# Patient Record
Sex: FEMALE | Race: WHITE | Employment: FULL TIME | URBAN - METROPOLITAN AREA
[De-identification: names, ages, dates, MRNs, and addresses within clinical notes are randomized per-mention and may not be internally consistent; named-entity substitution may affect disease eponyms.]

---

## 2019-05-02 ENCOUNTER — HOSPITAL ENCOUNTER (OUTPATIENT)
Dept: MRI IMAGING | Facility: CLINIC | Age: 39
Discharge: HOME | End: 2019-05-02
Attending: EMERGENCY MEDICINE | Admitting: EMERGENCY MEDICINE
Payer: COMMERCIAL

## 2019-05-02 ENCOUNTER — HOSPITAL ENCOUNTER (EMERGENCY)
Facility: CLINIC | Age: 39
Discharge: HOME | End: 2019-05-02
Attending: EMERGENCY MEDICINE | Admitting: EMERGENCY MEDICINE
Payer: COMMERCIAL

## 2019-05-02 VITALS
TEMPERATURE: 98.9 F | HEIGHT: 60 IN | DIASTOLIC BLOOD PRESSURE: 89 MMHG | SYSTOLIC BLOOD PRESSURE: 145 MMHG | HEART RATE: 105 BPM | RESPIRATION RATE: 20 BRPM | OXYGEN SATURATION: 98 % | WEIGHT: 170 LBS | BODY MASS INDEX: 33.38 KG/M2

## 2019-05-02 DIAGNOSIS — M50.10 CERVICAL DISC DISORDER WITH RADICULOPATHY: ICD-10-CM

## 2019-05-02 PROCEDURE — 99284 EMERGENCY DEPT VISIT MOD MDM: CPT | Mod: 25 | Performed by: EMERGENCY MEDICINE

## 2019-05-02 PROCEDURE — 72141 MRI NECK SPINE W/O DYE: CPT

## 2019-05-02 PROCEDURE — 99284 EMERGENCY DEPT VISIT MOD MDM: CPT | Mod: Z6 | Performed by: EMERGENCY MEDICINE

## 2019-05-02 RX ORDER — METAXALONE 800 MG/1
800 TABLET ORAL 3 TIMES DAILY
Qty: 24 TABLET | Refills: 1 | Status: SHIPPED | OUTPATIENT
Start: 2019-05-02 | End: 2019-05-03

## 2019-05-02 RX ORDER — HYDROCODONE BITARTRATE AND ACETAMINOPHEN 5; 325 MG/1; MG/1
1-2 TABLET ORAL EVERY 4 HOURS PRN
Qty: 12 TABLET | Refills: 0 | Status: SHIPPED | OUTPATIENT
Start: 2019-05-02

## 2019-05-02 RX ORDER — CYCLOBENZAPRINE HCL 10 MG
10 TABLET ORAL
COMMUNITY
Start: 2019-04-23 | End: 2019-05-03

## 2019-05-02 RX ORDER — HYDROCODONE BITARTRATE AND ACETAMINOPHEN 5; 325 MG/1; MG/1
1 TABLET ORAL
COMMUNITY
Start: 2019-04-24 | End: 2019-05-02

## 2019-05-02 ASSESSMENT — ENCOUNTER SYMPTOMS
NECK PAIN: 1
NUMBNESS: 1
WEAKNESS: 1
BACK PAIN: 0
CONSTITUTIONAL NEGATIVE: 1

## 2019-05-02 ASSESSMENT — MIFFLIN-ST. JEOR: SCORE: 1359.67

## 2019-05-02 NOTE — ED PROVIDER NOTES
History     Chief Complaint   Patient presents with     Back Pain     Neck Pain     right sided neck, down to right shoulder, radiating to right arm.      YEIMY Huang is a 39 year old female who resents emergency department for evaluation of right-sided neck pain and shoulder pain radiating into the right arm.  Insidious onset of right neck pain after a benign lifting injury at work approximately 2 weeks ago. Pain is gradually worsened and now radiates into the right arm causing right hand paresthesia. She was seen in the emergency department in [place] on 4/23/2019 and was felt to have a benign strain injury and was prescribed 4 tablets of Norco.  She was then seen in clinic 6 days later, 4/29/2018, 3 days ago and had cervical spine films which were unremarkable and Flexeril was added.  She reports that there was discussion of an MRI being ordered during the clinic evaluation but has not yet been ordered.  No prior history of similar neck problems.  She states that the neck and shoulder pain is now severe, constant, tight and pulling and refractory to Norco and Flexeril, although these medications help her sleep at night.  She has weakness in the right shoulder/upper arm and difficulty lifting the right arm above shoulder height, but no distal arm or hand weakness.  No arm swelling or pallor.  No lower extremity numbness or weakness.  No fever or chills.  No other acute complaints or concerns.    Allergies:  Allergies   Allergen Reactions     No Known Allergies        Problem List:    Patient Active Problem List    Diagnosis Date Noted     Polyhydramnios, antepartum complication 11/07/2001     Priority: Medium     High-risk pregnancy 08/23/2001     Priority: Medium     Problem list name updated by automated process. Provider to review       Supervision of other normal pregnancy 08/02/2001     Priority: Medium     Pregnancy with other poor obstetric history 08/02/2001     Priority: Medium      "Problem list name updated by automated process. Provider to review and confirm       Asthma 2001     Priority: Medium     Problem list name updated by automated process. Provider to review          Past Medical History:    Past Medical History:   Diagnosis Date     Unspecified asthma(493.90)        Past Surgical History:    Past Surgical History:   Procedure Laterality Date     C  DELIVERY ONLY  2000           Family History:    Family History   Problem Relation Age of Onset     Diabetes Mother         gestational     Hypertension Father        Social History:  Marital Status:  Single [1]  Social History     Tobacco Use     Smoking status: Not on file   Substance Use Topics     Alcohol use: Not on file     Drug use: Not on file        Medications:      cyclobenzaprine (FLEXERIL) 10 MG tablet   HYDROcodone-acetaminophen (NORCO) 5-325 MG tablet   metaxalone (SKELAXIN) 800 MG tablet   ALBUTEROL 90 MCG/ACT IN AERS   ALLEGRA 60 MG OR CAPS   ATROVENT 0.02 % IN SOLN   FLOVENT 110 MCG/ACT IN AERO   THEOPHYLLINE 300 MG OR CP24       Review of Systems   Constitutional: Negative.    HENT: Negative.    Musculoskeletal: Positive for neck pain. Negative for back pain.   Skin: Negative.    Neurological: Positive for weakness ( Difficulty raising the right arm above shoulder height) and numbness ( Right hand).       Physical Exam   BP: 145/89  Pulse: 105  Temp: 98.9  F (37.2  C)  Resp: 20  Height: 151.1 cm (4' 11.5\")  Weight: 77.1 kg (170 lb)  SpO2: 98 %      Physical Exam   Constitutional: She is oriented to person, place, and time. She appears well-developed and well-nourished. No distress.   HENT:   Head: Normocephalic and atraumatic.   Mouth/Throat: Oropharynx is clear and moist.   Eyes: Conjunctivae and EOM are normal. No scleral icterus.   Neck: Neck supple. Normal carotid pulses present. Muscular tenderness present. Decreased range of motion present. No tracheal deviation, no edema and no erythema " present.       Cardiovascular: Normal rate, regular rhythm and normal heart sounds. Exam reveals no gallop and no friction rub.   No murmur heard.  Pulmonary/Chest: Effort normal and breath sounds normal. No respiratory distress. She has no wheezes. She has no rales.   Abdominal: Soft. She exhibits no distension. There is no tenderness.   Musculoskeletal: She exhibits no edema or tenderness.   Lymphadenopathy:     She has no cervical adenopathy.   Neurological: She is alert and oriented to person, place, and time. No sensory deficit. She exhibits normal muscle tone. Coordination normal.   Skin: Skin is warm and dry. No rash noted. She is not diaphoretic. No erythema. No pallor.   Psychiatric: She has a normal mood and affect. Her behavior is normal.   Nursing note and vitals reviewed.      ED Course        Procedures                   No results found for this or any previous visit (from the past 24 hour(s)).    Medications - No data to display    MRI not currently available here.  Will order an outpatient MRI, have her get this as soon as possible and follow-up with her primary care provider to discuss results and referral to spine surgeon.  We will make an orthopedic  referral for her.    Assessments & Plan (with Medical Decision Making)   Approximately 2 weeks of right neck and posterior shoulder pain which has progressed and now radiating into the right arm causing right hand paresthesia and weakness of the deltoid musculature and difficulty raising the right arm above shoulder height.  No distal weakness.  Seen in clinic 3 days ago and had unremarkable cervical spine films.  An MRI was to be ordered, but has not been done so yet.  I ordered a cervical MRI and recommended she follow-up in clinic to review the results of this and discuss referral to a spine surgeon.  I refilled Norco (12 tablets) and she can try Skelaxin for muscle relaxation.  I also recommended the addition of an NSAID.  She will  return for new or worsening symptoms.    I have reviewed the nursing notes.    I have reviewed the findings, diagnosis, plan and need for follow up with the patient.       Medication List      Started    metaxalone 800 MG tablet  Commonly known as:  SKELAXIN  800 mg, Oral, 3 TIMES DAILY        Modified    HYDROcodone-acetaminophen 5-325 MG tablet  Commonly known as:  NORCO  1-2 tablets, Oral, EVERY 4 HOURS PRN, maximum 6 tablet(s) per day  What changed:      how much to take    when to take this    reasons to take this    additional instructions            Final diagnoses:   Cervical disc disorder with radiculopathy       5/2/2019   [facility] EMERGENCY DEPARTMENT     Patrick Tucker MD  05/02/19 [id number]

## 2019-05-02 NOTE — ED TRIAGE NOTES
"Pt reports lifting injury/twisting injury last week. Pt states pain was not immediate, but progressive after incident. Pt reports pain in neck radiating down right shoulder bladder and right arm. Pt states she is having difficulty moving right arm above shoulder now. Pt was seen in UC at [facility] and given steroids, flexaril and pain meds. Pt states she was unable to sleep lat night due to pain. Pt states \"I need to get back to work\". 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 Pain currently 6/10, constant, burning and aching in nature.   "

## 2019-05-02 NOTE — ED NOTES
Reviewed discharge instructions with patient. Pt understands pain med management along with heat and ice.  Pt understands to call and set up MRI then follow up with primary MD

## 2019-05-02 NOTE — ED AVS SNAPSHOT
[facility]  [address] [address]  Phone:  [phone]  Fax:  [phone]                                    Pearl Huang   MRN: [id number]    Department:  [facility]   Date of Visit:  5/2/2019           After Visit Summary Signature Page    I have received my discharge instructions, and my questions have been answered. I have discussed any challenges I see with this plan with the nurse or doctor.    ..........................................................................................................................................  Patient/Patient Representative Signature      ..........................................................................................................................................  Patient Representative Print Name and Relationship to Patient    ..................................................               ................................................  Date                                   Time    ..........................................................................................................................................  Reviewed by Signature/Title    ...................................................              ..............................................  Date                                               Time          22EPIC Rev 08/18

## 2019-05-03 ENCOUNTER — OFFICE VISIT (OUTPATIENT)
Dept: FAMILY MEDICINE | Facility: CLINIC | Age: 39
End: 2019-05-03
Payer: COMMERCIAL

## 2019-05-03 VITALS
OXYGEN SATURATION: 98 % | HEIGHT: 60 IN | HEART RATE: 104 BPM | TEMPERATURE: 97.4 F | BODY MASS INDEX: 36.12 KG/M2 | DIASTOLIC BLOOD PRESSURE: 94 MMHG | WEIGHT: 184 LBS | SYSTOLIC BLOOD PRESSURE: 154 MMHG

## 2019-05-03 DIAGNOSIS — M62.838 NECK MUSCLE SPASM: Primary | ICD-10-CM

## 2019-05-03 PROBLEM — J45.21 MILD INTERMITTENT ASTHMA WITH ACUTE EXACERBATION: Status: ACTIVE | Noted: 2018-01-04

## 2019-05-03 PROCEDURE — 99203 OFFICE O/P NEW LOW 30 MIN: CPT | Mod: 25 | Performed by: INTERNAL MEDICINE

## 2019-05-03 PROCEDURE — 96372 THER/PROPH/DIAG INJ SC/IM: CPT | Performed by: INTERNAL MEDICINE

## 2019-05-03 RX ORDER — KETOROLAC TROMETHAMINE 30 MG/ML
30 INJECTION, SOLUTION INTRAMUSCULAR; INTRAVENOUS ONCE
Status: COMPLETED | OUTPATIENT
Start: 2019-05-03 | End: 2019-05-03

## 2019-05-03 RX ORDER — TIZANIDINE 2 MG/1
2-4 TABLET ORAL 3 TIMES DAILY PRN
Qty: 60 TABLET | Refills: 1 | Status: SHIPPED | OUTPATIENT
Start: 2019-05-03 | End: 2020-08-19

## 2019-05-03 RX ORDER — KETOROLAC TROMETHAMINE 10 MG/1
10 TABLET, FILM COATED ORAL EVERY 6 HOURS PRN
Qty: 20 TABLET | Refills: 0 | Status: SHIPPED | OUTPATIENT
Start: 2019-05-03 | End: 2019-05-08

## 2019-05-03 RX ORDER — ALBUTEROL SULFATE 90 UG/1
1-2 AEROSOL, METERED RESPIRATORY (INHALATION)
COMMUNITY
Start: 2019-04-29

## 2019-05-03 RX ORDER — CETIRIZINE HYDROCHLORIDE 10 MG/1
10 TABLET ORAL
COMMUNITY
Start: 2019-04-29

## 2019-05-03 RX ADMIN — KETOROLAC TROMETHAMINE 30 MG: 30 INJECTION, SOLUTION INTRAMUSCULAR; INTRAVENOUS at 14:52

## 2019-05-03 ASSESSMENT — ANXIETY QUESTIONNAIRES
1. FEELING NERVOUS, ANXIOUS, OR ON EDGE: NEARLY EVERY DAY
2. NOT BEING ABLE TO STOP OR CONTROL WORRYING: NEARLY EVERY DAY
GAD7 TOTAL SCORE: 19
IF YOU CHECKED OFF ANY PROBLEMS ON THIS QUESTIONNAIRE, HOW DIFFICULT HAVE THESE PROBLEMS MADE IT FOR YOU TO DO YOUR WORK, TAKE CARE OF THINGS AT HOME, OR GET ALONG WITH OTHER PEOPLE: SOMEWHAT DIFFICULT
5. BEING SO RESTLESS THAT IT IS HARD TO SIT STILL: NEARLY EVERY DAY
6. BECOMING EASILY ANNOYED OR IRRITABLE: NEARLY EVERY DAY
3. WORRYING TOO MUCH ABOUT DIFFERENT THINGS: NEARLY EVERY DAY
7. FEELING AFRAID AS IF SOMETHING AWFUL MIGHT HAPPEN: SEVERAL DAYS

## 2019-05-03 ASSESSMENT — ASTHMA QUESTIONNAIRES
ACT_TOTALSCORE: 17
QUESTION_3 LAST FOUR WEEKS HOW OFTEN DID YOUR ASTHMA SYMPTOMS (WHEEZING, COUGHING, SHORTNESS OF BREATH, CHEST TIGHTNESS OR PAIN) WAKE YOU UP AT NIGHT OR EARLIER THAN USUAL IN THE MORNING: NOT AT ALL
QUESTION_1 LAST FOUR WEEKS HOW MUCH OF THE TIME DID YOUR ASTHMA KEEP YOU FROM GETTING AS MUCH DONE AT WORK, SCHOOL OR AT HOME: NONE OF THE TIME
QUESTION_5 LAST FOUR WEEKS HOW WOULD YOU RATE YOUR ASTHMA CONTROL: SOMEWHAT CONTROLLED
QUESTION_4 LAST FOUR WEEKS HOW OFTEN HAVE YOU USED YOUR RESCUE INHALER OR NEBULIZER MEDICATION (SUCH AS ALBUTEROL): ONE OR TWO TIMES PER DAY
QUESTION_2 LAST FOUR WEEKS HOW OFTEN HAVE YOU HAD SHORTNESS OF BREATH: ONCE A DAY

## 2019-05-03 ASSESSMENT — MIFFLIN-ST. JEOR: SCORE: 1423.18

## 2019-05-03 ASSESSMENT — PATIENT HEALTH QUESTIONNAIRE - PHQ9
5. POOR APPETITE OR OVEREATING: NEARLY EVERY DAY
SUM OF ALL RESPONSES TO PHQ QUESTIONS 1-9: 15

## 2019-05-03 NOTE — PROGRESS NOTES
Prior to injection, verified patient identity using patient's name and date of birth.  Due to injection administration, patient instructed to remain in clinic for 15 minutes  afterwards, and to report any adverse reaction to me immediately.    Ketorolac Tromethamine    Drug Amount Wasted:  Yes: 1 mg/ml   Vial/Syringe: Single dose vial  Expiration Date:  09/19

## 2019-05-03 NOTE — LETTER
[facility]  [address] [place] [address]  Phone: [phone]    May 3, 2019        Pearl Huang  [address] [place] [address]          To whom it may concern:    RE: Pearl Huang    Patient was seen and treated today at our clinic.  Patient may return to work with the following:  Light duty-unable to lift more with the right arm than 10 pounds or any repetitive activity for the next week.      Please contact me for questions or concerns.      Sincerely,        Torres Garzon MD

## 2019-05-03 NOTE — PROGRESS NOTES
"  SUBJECTIVE:   Pearl Huang is a 39 year old female who presents to clinic today for the following   health issues:    Chief Complaint   Patient presents with     ER F/U     Would like to go over MRI results. Can not take the muscle relaxer prescribed in ED because of insurance, metaxalone.     ED/ Followup:    Facility:  [facility]  Date of visit: 5/2/19  Reason for visit: Cervical disc disorder with radiculopathy, neck and back pain  Current Status: about the same, still in a lot of pain. Back and neck pain, down right arm        Pearl developed neck pain a couple of weeks ago while lifting a bucket out of truck.  Pain radiates down the back and right arm with tingling in all fingers intermittently.  Constant headache.  She had been seen in [facility] ED on 4/23 initially with 4/29 clinic follow-up.  Had been prescribed prednisone without improvement.  Had been taking Flexeril (helped with sleep but can take during the day), Tylenol, NSAIDs.  She returned to our ED on 5/2 as below:    \"  Assessments & Plan (with Medical Decision Making)   Approximately 2 weeks of right neck and posterior shoulder pain which has progressed and now radiating into the right arm causing right hand paresthesia and weakness of the deltoid musculature and difficulty raising the right arm above shoulder height.  No distal weakness.  Seen in clinic 3 days ago and had unremarkable cervical spine films.  An MRI was to be ordered, but has not been done so yet.  I ordered a cervical MRI and recommended she follow-up in clinic to review the results of this and discuss referral to a spine surgeon.  I refilled Norco (12 tablets) and she can try Skelaxin for muscle relaxation.  I also recommended the addition of an NSAID.  She will return for new or worsening symptoms.\"    MRI of the cervical spine was done yesterday and is normal.     She wasn't able to get the Skelaxin since this was not covered by insurance.  Norco has been " "helping.   \"Pills aren't going to solve anything, they just cover up the pain.\"  Seems to be getting worse.        Additional history: as documented    Reviewed  and updated as needed this visit by clinical staff  Tobacco  Allergies  Meds  Med Hx  Surg Hx  Fam Hx  Soc Hx        Reviewed and updated as needed this visit by Provider         Patient Active Problem List   Diagnosis     Supervision of other normal pregnancy     Pregnancy with other poor obstetric history     Asthma     High-risk pregnancy     Polyhydramnios, antepartum complication     Vitamin D deficiency     Mild intermittent asthma with acute exacerbation     Migraine     History of methamphetamine abuse     Carpal tunnel syndrome     Bipolar disorder (H)     Past Surgical History:   Procedure Laterality Date     C  DELIVERY ONLY  2000           Social History     Tobacco Use     Smoking status: Current Every Day Smoker     Smokeless tobacco: Former User   Substance Use Topics     Alcohol use: Yes     Comment: occasional     Family History   Problem Relation Age of Onset     Diabetes Mother         gestational     Hypertension Father          Current Outpatient Medications   Medication Sig Dispense Refill     albuterol (VENTOLIN HFA) 108 (90 Base) MCG/ACT inhaler Inhale 1-2 puffs into the lungs       cetirizine (ZYRTEC) 10 MG tablet Take 10 mg by mouth       HYDROcodone-acetaminophen (NORCO) 5-325 MG tablet Take 1-2 tablets by mouth every 4 hours as needed for moderate to severe pain or severe pain maximum 6 tablet(s) per day 12 tablet 0     ketorolac (TORADOL) 10 MG tablet Take 1 tablet (10 mg) by mouth every 6 hours as needed for moderate pain 20 tablet 0     tiZANidine (ZANAFLEX) 2 MG tablet Take 1-2 tablets (2-4 mg) by mouth 3 times daily as needed for muscle spasms 60 tablet 1     Allergies   Allergen Reactions     No Known Allergies        ROS:  Constitutional, MSK, neuro systems are negative, except as otherwise " "noted.    OBJECTIVE:     BP (!) 154/94   Pulse 104   Temp 97.4  F (36.3  C) (Tympanic)   Ht 1.511 m (4' 11.5\")   Wt 83.5 kg (184 lb)   SpO2 98%   BMI 36.54 kg/m    Body mass index is 36.54 kg/m .    GENERAL: healthy, alert and no distress  NECK: lateral right neck muscle tightness and pain to palpation  NEURO: reduced strength right arm to abduction, however strength is normal at the elbow and hand , normal light touch sensation in arms bilaterally        ASSESSMENT/PLAN:         1. Neck muscle spasm    Betzy presents with ongoing neck pain.  Recent cervical MRI was normal, so the symptoms are most likely just due to muscle spasm.  Flexeril was somewhat helpful but too sedating for daytime use, we will try tizanidine see if she is able to tolerate that better.  We provided her with a Toradol IM injection in clinic and will follow this with a 5-day course of p.o.  Advised her not to take OTC NSAIDs together with this, but can alternate with Tylenol.  Recommended seeing physical therapy to see if they can do some massage work.  Referral placed.  Work restriction note provided.  Follow-up as needed if a couple of weeks if not improving.    - tiZANidine (ZANAFLEX) 2 MG tablet; Take 1-2 tablets (2-4 mg) by mouth 3 times daily as needed for muscle spasms  Dispense: 60 tablet; Refill: 1  - ketorolac (TORADOL) injection 30 mg  - ketorolac (TORADOL) 10 MG tablet; Take 1 tablet (10 mg) by mouth every 6 hours as needed for moderate pain  Dispense: 20 tablet; Refill: 0  - PHYSICAL THERAPY REFERRAL; Future    Chart documentation was done using Dragon dictation software. Although reviewed after completion, some errors may remain.     Torres Garzon MD  [facility] - IM        "

## 2019-05-03 NOTE — PATIENT INSTRUCTIONS
Try the tizanidine muscle relaxer if this is covered by your insurance.  Take the ketorolac for up to 5 days, do not use ibuprofen together with this medication.  You can alternate with Tylenol if needed.  Schedule with PT for exercises and possible massage work.

## 2019-05-04 ASSESSMENT — ASTHMA QUESTIONNAIRES: ACT_TOTALSCORE: 17

## 2019-05-04 ASSESSMENT — ANXIETY QUESTIONNAIRES: GAD7 TOTAL SCORE: 19

## 2019-07-03 ENCOUNTER — TELEPHONE (OUTPATIENT)
Dept: FAMILY MEDICINE | Facility: CLINIC | Age: 39
End: 2019-07-03

## 2019-07-03 NOTE — TELEPHONE ENCOUNTER
Panel Management Review      Patient has the following on her problem list: None    Patient Active Problem List   Diagnosis     Supervision of other normal pregnancy     Pregnancy with other poor obstetric history     Asthma     High-risk pregnancy     Polyhydramnios, antepartum complication     Vitamin D deficiency     Mild intermittent asthma with acute exacerbation     Migraine     History of methamphetamine abuse     Carpal tunnel syndrome     Bipolar disorder (H)       Composite cancer screening  Chart review shows that this patient is due/due soon for the following Pap Smear  Summary:    Patient is due/failing the following:   PAP    Action needed:   Patient needs office visit for pappe.    Type of outreach:    Sent letter.    Questions for provider review:    None                                                                                                                                    Eryn ORLANDO CMA ([facility])       Chart routed to none .

## 2019-07-03 NOTE — LETTER
July 3, 2019      Pearl Huang  [address] [place] [address]          Dear Pearl Huang, [phone]    At [facility] we care about your health and are committed to providing quality patient care, which includes staying current on preventative cancer screenings.  You can increase your chances of finding and treating cancers through regular screenings.      Our records show that you are due for the following screening(s):    Pap Smear for cervical cancer - [phone] to schedule  Recommended every three years for women 21 and older  A Pap test is used to detect cervical cancer.  The test should be taken at least once every three years but women who are at a greater risk for cervical cancer may need to have the test more often.      You are at a greater risk for cervical cancer if:   - You have had a sexually transmitted disease   - You have had more than one sex partner   - You have had an abnormal pap test in the past    If you have a My-Chart Account, you also can schedule this appointment through there.    If you have already had one or all of the above screening tests at another facility, please call us so that we may update your chart.      Your partners in health,      Quality Committee   [facility]/haley

## 2019-10-11 ENCOUNTER — TELEPHONE (OUTPATIENT)
Dept: FAMILY MEDICINE | Facility: CLINIC | Age: 39
End: 2019-10-11

## 2019-10-11 NOTE — LETTER
October 11, 2019      Pearl Huang  [address] [place] [address]          Dear Pearl Huang, [phone]    At [facility] we care about your health and are committed to providing quality patient care, which includes staying current on preventative cancer screenings.  You can increase your chances of finding and treating cancers through regular screenings.      Our records show that you are due for the following screening(s):    Pap Smear for cervical cancer - [phone] to schedule  Recommended every three years for women 21 and older  A Pap test is used to detect cervical cancer.  The test should be taken at least once every three years but women who are at a greater risk for cervical cancer may need to have the test more often.      You are at a greater risk for cervical cancer if:   - You have had a sexually transmitted disease   - You have had more than one sex partner   - You have had an abnormal pap test in the past    If you have a My-Chart Account, you also can schedule this appointment through there.    If you have already had one or all of the above screening tests at another facility, please call us so that we may update your chart.      Your partners in health,      Quality Committee   [facility]/haley

## 2019-10-11 NOTE — TELEPHONE ENCOUNTER
Panel Management Review      Patient has the following on her problem list: None    Patient Active Problem List   Diagnosis     Supervision of other normal pregnancy     Pregnancy with other poor obstetric history     Asthma     High-risk pregnancy     Polyhydramnios, antepartum complication     Vitamin D deficiency     Mild intermittent asthma with acute exacerbation     Migraine     History of methamphetamine abuse (H)     Carpal tunnel syndrome     Bipolar disorder (H)       Composite cancer screening  Chart review shows that this patient is due/due soon for the following Pap Smear  Summary:    Patient is due/failing the following:   PAP    Action needed:   Patient needs office visit for pappe.    Type of outreach:    Sent letter.    Questions for provider review:    None                                                                                                                                    Eryn ORLANDO CMA ([facility])       Chart routed to none .

## 2019-10-16 ENCOUNTER — TELEPHONE (OUTPATIENT)
Dept: FAMILY MEDICINE | Facility: CLINIC | Age: 39
End: 2019-10-16

## 2019-10-16 NOTE — LETTER
October 16, 2019      Pearl Huang  [address] [place] [address]          Dear Pearl Huang, [phone]    At [facility] we care about your health and are committed to providing quality patient care, which includes staying current on preventative cancer screenings.  You can increase your chances of finding and treating cancers through regular screenings.      Our records show that you are due for the following screening(s):    Pap Smear for cervical cancer - [phone] to schedule  Recommended every three years for women 21 and older  A Pap test is used to detect cervical cancer.  The test should be taken at least once every three years but women who are at a greater risk for cervical cancer may need to have the test more often.      You are at a greater risk for cervical cancer if:   - You have had a sexually transmitted disease   - You have had more than one sex partner   - You have had an abnormal pap test in the past    If you have a My-Chart Account, you also can schedule this appointment through there.    If you have already had one or all of the above screening tests at another facility, please call us so that we may update your chart.      Your partners in health,      Quality Committee   [facility]/haley

## 2020-02-19 ENCOUNTER — TELEPHONE (OUTPATIENT)
Dept: FAMILY MEDICINE | Facility: CLINIC | Age: 40
End: 2020-02-19

## 2020-02-19 NOTE — TELEPHONE ENCOUNTER
Panel Management Review      Patient has the following on her problem list:   Patient Active Problem List   Diagnosis     Supervision of other normal pregnancy     Pregnancy with other poor obstetric history     Asthma     High-risk pregnancy     Polyhydramnios, antepartum complication     Vitamin D deficiency     Mild intermittent asthma with acute exacerbation     Migraine     History of methamphetamine abuse (H)     Carpal tunnel syndrome     Bipolar disorder (H)       Asthma review     ACT Total Scores 5/3/2019   ACT TOTAL SCORE (Goal Greater than or Equal to 20) 17   In the past 12 months, how many times did you visit the emergency room for your asthma without being admitted to the hospital? 0   In the past 12 months, how many times were you hospitalized overnight because of your asthma? 0      1. Is Asthma diagnosis on the Problem List? Yes    2. Is Asthma listed on Health Maintenance? Yes    3. Patient is due for:  ACT and AAP      Composite cancer screening  Chart review shows that this patient is due/due soon for the following Pap Smear  Summary:    Patient is due/failing the following:   AAP, ACT, PAP and PHYSICAL    Action needed:   Patient needs office visit for Physical and pap. and Patient needs to do ACT.    Type of outreach:    Sent letter.    Questions for provider review:    None                                                                                                                                    Eryn ORLANDO CMA ([facility])       Chart routed to none .

## 2020-02-19 NOTE — LETTER
February 19, 2020      Pearl Huang  [address] [place] [address]          Dear Pearl Huang, [phone]    At [facility] we care about your health and are committed to providing quality patient care, which includes staying current on preventative cancer screenings.  You can increase your chances of finding and treating cancers through regular screenings.      Our records show that you are due for the following screening(s):      Pap Smear for cervical cancer - [phone] to schedule  Recommended every three years for women 21 and older  A Pap test is used to detect cervical cancer.  The test should be taken at least once every three years but women who are at a greater risk for cervical cancer may need to have the test more often.      You are at a greater risk for cervical cancer if:   - You have had a sexually transmitted disease   - You have had more than one sex partner   - You have had an abnormal pap test in the past    If you have a My-Chart Account, you also can schedule this appointment through there.    If you have already had one or all of the above screening tests at another facility, please call us so that we may update your chart.      Your partners in health,      Quality Committee   [facility]/haley

## 2020-08-15 ENCOUNTER — TELEPHONE (OUTPATIENT)
Dept: FAMILY MEDICINE | Facility: CLINIC | Age: 40
End: 2020-08-15

## 2020-08-15 DIAGNOSIS — M62.838 NECK MUSCLE SPASM: ICD-10-CM

## 2020-08-15 NOTE — LETTER
August 20, 2020      Pearl Huang  [address] [place] [address] [address] [place] [address]        Dear Pearl,     We received a refill request for your tizanidine medication.  This medication has been refilled for 30 days as you are due for an office or virtual visit for further refills.  Please call [phone] to schedule an appointment.        Sincerely,        Torres Garzon MD

## 2020-08-17 NOTE — TELEPHONE ENCOUNTER
Requested Prescriptions   Pending Prescriptions Disp Refills     tiZANidine (ZANAFLEX) 2 MG tablet [Pharmacy Med Name: TIZANIDINE 2MG TABLETS] 60 tablet 1     Sig: TAKE 1 TO 2 TABLETS BY MOUTH THREE TIMES DAILY AS NEEDED       There is no refill protocol information for this order

## 2020-08-19 RX ORDER — TIZANIDINE 2 MG/1
TABLET ORAL
Qty: 60 TABLET | Refills: 0 | Status: SHIPPED | OUTPATIENT
Start: 2020-08-19

## 2020-08-19 NOTE — TELEPHONE ENCOUNTER
Routing refill request to provider for review/approval because:  Drug not on the [facility] refill protocol       Requested Prescriptions   Pending Prescriptions Disp Refills     tiZANidine (ZANAFLEX) 2 MG tablet [Pharmacy Med Name: TIZANIDINE 2MG TABLETS] 60 tablet 1     Sig: TAKE 1 TO 2 TABLETS BY MOUTH THREE TIMES DAILY AS NEEDED       There is no refill protocol information for this order        Jennyfer FLYNN RN BSN

## 2020-08-19 NOTE — TELEPHONE ENCOUNTER
One refill sent.  Patient has not been seen in over a year, recommend virtual visit for follow-up.  Also should have asthma reviewed at that time.     Thanks,  Torres Garzon MD